# Patient Record
Sex: FEMALE | Race: WHITE | Employment: FULL TIME | URBAN - NONMETROPOLITAN AREA
[De-identification: names, ages, dates, MRNs, and addresses within clinical notes are randomized per-mention and may not be internally consistent; named-entity substitution may affect disease eponyms.]

---

## 2018-07-18 ENCOUNTER — HOSPITAL ENCOUNTER (OUTPATIENT)
Age: 28
Discharge: HOME | End: 2018-07-20
Payer: COMMERCIAL

## 2018-07-18 ENCOUNTER — OFFICE VISIT (OUTPATIENT)
Dept: PRIMARY CARE CLINIC | Age: 28
End: 2018-07-18
Payer: COMMERCIAL

## 2018-07-18 ENCOUNTER — HOSPITAL ENCOUNTER (OUTPATIENT)
Dept: GENERAL RADIOLOGY | Age: 28
Discharge: HOME | End: 2018-07-20
Payer: COMMERCIAL

## 2018-07-18 VITALS
DIASTOLIC BLOOD PRESSURE: 88 MMHG | WEIGHT: 132 LBS | TEMPERATURE: 98.7 F | SYSTOLIC BLOOD PRESSURE: 126 MMHG | HEART RATE: 93 BPM | OXYGEN SATURATION: 98 % | BODY MASS INDEX: 22.53 KG/M2 | HEIGHT: 64 IN

## 2018-07-18 DIAGNOSIS — S60.051A CONTUSION OF RIGHT LITTLE FINGER WITHOUT DAMAGE TO NAIL, INITIAL ENCOUNTER: ICD-10-CM

## 2018-07-18 DIAGNOSIS — S62.656A CLOSED NONDISPLACED FRACTURE OF MIDDLE PHALANX OF RIGHT LITTLE FINGER, INITIAL ENCOUNTER: Primary | ICD-10-CM

## 2018-07-18 PROBLEM — F11.21 OPIOID DEPENDENCE IN REMISSION (HCC): Status: ACTIVE | Noted: 2018-01-16

## 2018-07-18 PROCEDURE — 73140 X-RAY EXAM OF FINGER(S): CPT

## 2018-07-18 PROCEDURE — 99202 OFFICE O/P NEW SF 15 MIN: CPT | Performed by: NURSE PRACTITIONER

## 2018-07-18 NOTE — LETTER
[facility] [address] [id number]  Phone: [phone]  Fax: [phone]    NANCY Larson CNP        July 18, 2018     Patient: Alysa Velasquez   YOB: 1990   Date of Visit: 7/18/2018       To Whom it May Concern:    Alysa Velasquez was seen in my clinic on 7/18/2018. She may return to work on 07/19/2018 with no use of right hand until cleared by occupational health. Wear finger splint for comfort and support. If you have any questions or concerns, please don't hesitate to call.     Sincerely,         NANCY Larson CNP

## 2018-07-18 NOTE — PROGRESS NOTES
Constitutional:   Appears to be of stated age with warm, dry skin; normal coloration without rash of the exposed skin. Patient is well-appearing, well-hydrated, nontoxic, comfortable, alert and oriented, pleasant and talkative without apparent distress. They are oriented for age with age appropriate behavior. Cardiovascular: Normal rate and regular rhythm. Pulmonary/Chest: Effort normal and breath sounds normal.   Musculoskeletal:        Right elbow: Normal.       Right wrist: Normal.        Hands:  Remainder of right hand exam within normal limits without deformity and or pain of the metacarpals. Right hand digits thumb through ring finger without obvious deformity and/or decreased range of motion on exam.  Skin intact of right hand and all fingers. Neurological: She has normal strength and normal reflexes. Nursing note and vitals reviewed. /88   Pulse 93   Temp 98.7 °F (37.1 °C) (Oral)   Ht 5' 4\" (1.626 m)   Wt 132 lb (59.9 kg)   LMP 07/02/2018 (Exact Date)   SpO2 98%   Breastfeeding? No   BMI 22.66 kg/m²     Assessment:      Diagnosis Orders   1. Closed nondisplaced fracture of middle phalanx of right little finger, initial encounter     2. Contusion of right little finger without damage to nail, initial encounter  XR FINGER RIGHT (MIN 2 VIEWS)    Elastic Bandages & Supports (BASEBALL SPLINT) MISC    COBAN 3\"    ibuprofen (ADVIL;MOTRIN) 200 MG tablet       Plan:   Perez Wooten was seen today for finger injury. Diagnoses and all orders for this visit:    Closed nondisplaced fracture of middle phalanx of right little finger, initial encounter    Contusion of right little finger without damage to nail, initial encounter  -     XR FINGER RIGHT (MIN 2 VIEWS);  Future  -     Elastic Bandages & Supports (BASEBALL SPLINT) MISC; 1 Units by Does not apply route daily To right pinky finger for comfort and protection until instructed by [address] 3\"  -     ibuprofen

## 2018-07-18 NOTE — PATIENT INSTRUCTIONS
SURVEY:    You may be receiving a survey from [employer] regarding your visit today. Please complete the survey to enable us to provide the highest quality of care to you and your family. If you cannot score us a very good on any question, please call the office to discuss how we could of made your experience a very good one. Thank you. Patient Education        Hand Bruises: Care Instructions  Your Care Instructions  Bruises, or contusions, can happen as a result of an impact or fall. Most people think of a bruise as a black-and-blue spot. This happens when small blood vessels get torn and leak blood under the skin. The bruise may turn purplish black, reddish blue, or yellowish green as it heals. But bones and muscles can also get bruised. This may damage the hand but not cause a bruise that you can see. Most bruises aren't serious and will go away on their own in 2 to 4 weeks. But sometimes a more serious hand injury might not heal on its own. Tell your doctor if you have new symptoms or your injury is not getting better over time. You may have tests to see if you have bone or nerve damage. These tests may include X-rays, a CT scan, or an MRI. If you damaged bones or muscles, you may need more treatment. The doctor has checked you carefully, but problems can develop later. If you notice any problems or new symptoms, get medical treatment right away. Follow-up care is a key part of your treatment and safety. Be sure to make and go to all appointments, and call your doctor if you are having problems. It's also a good idea to know your test results and keep a list of the medicines you take. How can you care for yourself at home? · Put ice or a cold pack on the hand for 10 to 20 minutes at a time. Put a thin cloth between the ice and your skin. · Prop up your hand on a pillow when you ice it or anytime you sit or lie down during the next 3 days. Try to keep your hand above the level of your heart. This will help reduce swelling. · Be safe with medicines. Read and follow all instructions on the label. ¨ If the doctor gave you a prescription medicine for pain, take it as prescribed. ¨ If you are not taking a prescription pain medicine, ask your doctor if you can take an over-the-counter medicine. · Be sure to follow your doctor's advice about moving and exercising your injured hand. When should you call for help? Call your doctor now or seek immediate medical care if:    · Your pain gets worse.     · You have new or worse swelling.     · You have tingling, weakness, or numbness in the area near the bruise.     · The area near the bruise is cold or pale.     · You have symptoms of infection, such as:  ¨ Increased pain, swelling, warmth, or redness. ¨ Red streaks leading from the area. ¨ Pus draining from the area. ¨ A fever.    Watch closely for changes in your health, and be sure to contact your doctor if:    · You do not get better as expected. Where can you learn more? Go to [email or web address] and sign in to your [employer] account. Enter M422 in the [facility] box to learn more about \"Hand Bruises: Care Instructions. \"     If you do not have an account, please click on the \"Sign Up Now\" link. Current as of: November 20, 2017  Content Version: 11.6  © 8088-3825 [employer]. Care instructions adapted under license by [facility] ([facility]). If you have questions about a medical condition or this instruction, always ask your healthcare professional. [place] [address] any warranty or liability for your use of this information.

## 2018-07-19 RX ORDER — IBUPROFEN 200 MG
400 TABLET ORAL EVERY 6 HOURS PRN
COMMUNITY
Start: 2018-07-19

## 2018-07-24 ENCOUNTER — TELEPHONE (OUTPATIENT)
Dept: PRIMARY CARE CLINIC | Age: 28
End: 2018-07-24

## 2018-07-24 NOTE — TELEPHONE ENCOUNTER
Patient called in stating she was seen at the [facility] on 07/18/18 for a finger injury. Pt stated she injured her pinky finger at work. Pt was asked at the window if she wanted to go through workers comp and she stated \"no. \" Pt called in today stating she has to have surgery on her finger, and now wants to go through workers comp. Pt advised to call Melania at [facility] and let her know that she would like to do that and go from there. Pt voiced understanding.

## 2018-07-26 ENCOUNTER — HOSPITAL ENCOUNTER (OUTPATIENT)
Dept: GENERAL RADIOLOGY | Age: 28
Discharge: HOME | End: 2018-07-28
Payer: COMMERCIAL

## 2018-07-26 ENCOUNTER — HOSPITAL ENCOUNTER (OUTPATIENT)
Age: 28
Discharge: HOME | End: 2018-07-28
Payer: COMMERCIAL

## 2018-07-26 ENCOUNTER — HOSPITAL ENCOUNTER (OUTPATIENT)
Age: 28
Discharge: HOME | End: 2018-07-26
Payer: COMMERCIAL

## 2018-07-26 ENCOUNTER — HOSPITAL ENCOUNTER (OUTPATIENT)
Dept: PREADMISSION TESTING | Age: 28
Discharge: HOME | End: 2018-07-26
Payer: COMMERCIAL

## 2018-07-26 DIAGNOSIS — Z01.811 PRE-OP CHEST EXAM: ICD-10-CM

## 2018-07-26 LAB
ABSOLUTE EOS #: 0.1 K/UL (ref 0–0.4)
ABSOLUTE IMMATURE GRANULOCYTE: NORMAL K/UL (ref 0–0.3)
ABSOLUTE LYMPH #: 1.8 K/UL (ref 1–4.8)
ABSOLUTE MONO #: 0.5 K/UL (ref 0–1)
ANION GAP SERPL CALCULATED.3IONS-SCNC: 9 MMOL/L (ref 9–17)
BASOPHILS # BLD: 1 % (ref 0–2)
BASOPHILS ABSOLUTE: 0 K/UL (ref 0–0.2)
BUN BLDV-MCNC: 12 MG/DL (ref 6–20)
BUN/CREAT BLD: 19 (ref 9–20)
CALCIUM SERPL-MCNC: 9.5 MG/DL (ref 8.6–10.4)
CHLORIDE BLD-SCNC: 103 MMOL/L (ref 98–107)
CO2: 24 MMOL/L (ref 20–31)
CREAT SERPL-MCNC: 0.63 MG/DL (ref 0.5–0.9)
DIFFERENTIAL TYPE: YES
EOSINOPHILS RELATIVE PERCENT: 2 % (ref 0–5)
GFR AFRICAN AMERICAN: >60 ML/MIN
GFR NON-AFRICAN AMERICAN: >60 ML/MIN
GFR SERPL CREATININE-BSD FRML MDRD: NORMAL ML/MIN/{1.73_M2}
GFR SERPL CREATININE-BSD FRML MDRD: NORMAL ML/MIN/{1.73_M2}
GLUCOSE BLD-MCNC: 91 MG/DL (ref 70–99)
HCG QUALITATIVE: NEGATIVE
HCT VFR BLD CALC: 44.2 % (ref 36–46)
HEMOGLOBIN: 14.8 G/DL (ref 12–16)
IMMATURE GRANULOCYTES: NORMAL %
LYMPHOCYTES # BLD: 27 % (ref 15–40)
MCH RBC QN AUTO: 30 PG (ref 26–34)
MCHC RBC AUTO-ENTMCNC: 33.5 G/DL (ref 31–37)
MCV RBC AUTO: 89.3 FL (ref 80–100)
MONOCYTES # BLD: 7 % (ref 4–8)
NRBC AUTOMATED: NORMAL PER 100 WBC
PDW BLD-RTO: 14.7 % (ref 12.1–15.2)
PLATELET # BLD: 182 K/UL (ref 140–450)
PLATELET ESTIMATE: NORMAL
PMV BLD AUTO: NORMAL FL (ref 6–12)
POTASSIUM SERPL-SCNC: 4.6 MMOL/L (ref 3.7–5.3)
RBC # BLD: 4.95 M/UL (ref 4–5.2)
RBC # BLD: NORMAL 10*6/UL
SEG NEUTROPHILS: 63 % (ref 47–75)
SEGMENTED NEUTROPHILS ABSOLUTE COUNT: 4.1 K/UL (ref 2.5–7)
SODIUM BLD-SCNC: 136 MMOL/L (ref 135–144)
WBC # BLD: 6.6 K/UL (ref 3.5–11)
WBC # BLD: NORMAL 10*3/UL

## 2018-07-26 PROCEDURE — 36415 COLL VENOUS BLD VENIPUNCTURE: CPT

## 2018-07-26 PROCEDURE — 71046 X-RAY EXAM CHEST 2 VIEWS: CPT

## 2018-07-26 PROCEDURE — 80048 BASIC METABOLIC PNL TOTAL CA: CPT

## 2018-07-26 PROCEDURE — 85025 COMPLETE CBC W/AUTO DIFF WBC: CPT

## 2018-07-26 PROCEDURE — 84703 CHORIONIC GONADOTROPIN ASSAY: CPT

## 2018-07-27 ENCOUNTER — ANESTHESIA EVENT (OUTPATIENT)
Dept: OPERATING ROOM | Age: 28
End: 2018-07-27
Payer: COMMERCIAL

## 2018-08-01 ENCOUNTER — APPOINTMENT (OUTPATIENT)
Dept: GENERAL RADIOLOGY | Age: 28
End: 2018-08-01
Attending: ORTHOPAEDIC SURGERY
Payer: COMMERCIAL

## 2018-08-01 ENCOUNTER — HOSPITAL ENCOUNTER (OUTPATIENT)
Age: 28
Setting detail: OUTPATIENT SURGERY
Discharge: HOME | End: 2018-08-01
Attending: ORTHOPAEDIC SURGERY | Admitting: ORTHOPAEDIC SURGERY
Payer: COMMERCIAL

## 2018-08-01 ENCOUNTER — ANESTHESIA (OUTPATIENT)
Dept: OPERATING ROOM | Age: 28
End: 2018-08-01
Payer: COMMERCIAL

## 2018-08-01 VITALS
WEIGHT: 132 LBS | RESPIRATION RATE: 16 BRPM | TEMPERATURE: 97.5 F | OXYGEN SATURATION: 100 % | SYSTOLIC BLOOD PRESSURE: 124 MMHG | HEIGHT: 62 IN | HEART RATE: 98 BPM | BODY MASS INDEX: 24.29 KG/M2 | DIASTOLIC BLOOD PRESSURE: 88 MMHG

## 2018-08-01 VITALS
DIASTOLIC BLOOD PRESSURE: 59 MMHG | TEMPERATURE: 98.6 F | OXYGEN SATURATION: 100 % | RESPIRATION RATE: 17 BRPM | SYSTOLIC BLOOD PRESSURE: 97 MMHG

## 2018-08-01 DIAGNOSIS — S62.626D CLOSED DISPLACED FRACTURE OF MIDDLE PHALANX OF RIGHT LITTLE FINGER WITH ROUTINE HEALING, SUBSEQUENT ENCOUNTER: Primary | ICD-10-CM

## 2018-08-01 LAB — HCG(URINE) PREGNANCY TEST: NEGATIVE

## 2018-08-01 PROCEDURE — C1713 ANCHOR/SCREW BN/BN,TIS/BN: HCPCS | Performed by: ORTHOPAEDIC SURGERY

## 2018-08-01 PROCEDURE — 3700000000 HC ANESTHESIA ATTENDED CARE: Performed by: ORTHOPAEDIC SURGERY

## 2018-08-01 PROCEDURE — 2500000003 HC RX 250 WO HCPCS: Performed by: NURSE ANESTHETIST, CERTIFIED REGISTERED

## 2018-08-01 PROCEDURE — 7100000011 HC PHASE II RECOVERY - ADDTL 15 MIN: Performed by: ORTHOPAEDIC SURGERY

## 2018-08-01 PROCEDURE — 3600000013 HC SURGERY LEVEL 3 ADDTL 15MIN: Performed by: ORTHOPAEDIC SURGERY

## 2018-08-01 PROCEDURE — 3600000003 HC SURGERY LEVEL 3 BASE: Performed by: ORTHOPAEDIC SURGERY

## 2018-08-01 PROCEDURE — 2720000010 HC SURG SUPPLY STERILE: Performed by: ORTHOPAEDIC SURGERY

## 2018-08-01 PROCEDURE — 2709999900 HC NON-CHARGEABLE SUPPLY: Performed by: ORTHOPAEDIC SURGERY

## 2018-08-01 PROCEDURE — 6360000002 HC RX W HCPCS: Performed by: ORTHOPAEDIC SURGERY

## 2018-08-01 PROCEDURE — 7100000010 HC PHASE II RECOVERY - FIRST 15 MIN: Performed by: ORTHOPAEDIC SURGERY

## 2018-08-01 PROCEDURE — 6360000002 HC RX W HCPCS: Performed by: NURSE ANESTHETIST, CERTIFIED REGISTERED

## 2018-08-01 PROCEDURE — 84703 CHORIONIC GONADOTROPIN ASSAY: CPT

## 2018-08-01 PROCEDURE — 2500000003 HC RX 250 WO HCPCS: Performed by: ORTHOPAEDIC SURGERY

## 2018-08-01 PROCEDURE — 76000 FLUOROSCOPY <1 HR PHYS/QHP: CPT

## 2018-08-01 PROCEDURE — 2580000003 HC RX 258: Performed by: ORTHOPAEDIC SURGERY

## 2018-08-01 PROCEDURE — 3700000001 HC ADD 15 MINUTES (ANESTHESIA): Performed by: ORTHOPAEDIC SURGERY

## 2018-08-01 DEVICE — K WIRE FIX L4IN DIA0.045IN DBL END TRCR SMOOTH: Type: IMPLANTABLE DEVICE | Status: FUNCTIONAL

## 2018-08-01 RX ORDER — ONDANSETRON 2 MG/ML
4 INJECTION INTRAMUSCULAR; INTRAVENOUS EVERY 6 HOURS PRN
Status: DISCONTINUED | OUTPATIENT
Start: 2018-08-01 | End: 2018-08-01 | Stop reason: HOSPADM

## 2018-08-01 RX ORDER — SODIUM CHLORIDE 0.9 % (FLUSH) 0.9 %
10 SYRINGE (ML) INJECTION PRN
Status: DISCONTINUED | OUTPATIENT
Start: 2018-08-01 | End: 2018-08-01 | Stop reason: HOSPADM

## 2018-08-01 RX ORDER — SODIUM CHLORIDE, SODIUM LACTATE, POTASSIUM CHLORIDE, CALCIUM CHLORIDE 600; 310; 30; 20 MG/100ML; MG/100ML; MG/100ML; MG/100ML
INJECTION, SOLUTION INTRAVENOUS CONTINUOUS
Status: DISCONTINUED | OUTPATIENT
Start: 2018-08-01 | End: 2018-08-01 | Stop reason: HOSPADM

## 2018-08-01 RX ORDER — PROPOFOL 10 MG/ML
INJECTION, EMULSION INTRAVENOUS PRN
Status: DISCONTINUED | OUTPATIENT
Start: 2018-08-01 | End: 2018-08-01 | Stop reason: SDUPTHER

## 2018-08-01 RX ORDER — HYDROCODONE BITARTRATE AND ACETAMINOPHEN 5; 325 MG/1; MG/1
1 TABLET ORAL EVERY 4 HOURS PRN
Status: DISCONTINUED | OUTPATIENT
Start: 2018-08-01 | End: 2018-08-01

## 2018-08-01 RX ORDER — ACETAMINOPHEN 325 MG/1
650 TABLET ORAL EVERY 4 HOURS PRN
Status: DISCONTINUED | OUTPATIENT
Start: 2018-08-01 | End: 2018-08-01 | Stop reason: HOSPADM

## 2018-08-01 RX ORDER — ONDANSETRON 2 MG/ML
INJECTION INTRAMUSCULAR; INTRAVENOUS PRN
Status: DISCONTINUED | OUTPATIENT
Start: 2018-08-01 | End: 2018-08-01 | Stop reason: SDUPTHER

## 2018-08-01 RX ORDER — LIDOCAINE HYDROCHLORIDE 10 MG/ML
INJECTION, SOLUTION EPIDURAL; INFILTRATION; INTRACAUDAL; PERINEURAL PRN
Status: DISCONTINUED | OUTPATIENT
Start: 2018-08-01 | End: 2018-08-01 | Stop reason: SDUPTHER

## 2018-08-01 RX ORDER — BUPIVACAINE HYDROCHLORIDE 2.5 MG/ML
INJECTION, SOLUTION EPIDURAL; INFILTRATION; INTRACAUDAL PRN
Status: DISCONTINUED | OUTPATIENT
Start: 2018-08-01 | End: 2018-08-01 | Stop reason: HOSPADM

## 2018-08-01 RX ORDER — SODIUM CHLORIDE 0.9 % (FLUSH) 0.9 %
10 SYRINGE (ML) INJECTION EVERY 12 HOURS SCHEDULED
Status: DISCONTINUED | OUTPATIENT
Start: 2018-08-01 | End: 2018-08-01 | Stop reason: HOSPADM

## 2018-08-01 RX ORDER — HYDROCODONE BITARTRATE AND ACETAMINOPHEN 5; 325 MG/1; MG/1
2 TABLET ORAL EVERY 4 HOURS PRN
Status: DISCONTINUED | OUTPATIENT
Start: 2018-08-01 | End: 2018-08-01

## 2018-08-01 RX ORDER — HYDROCODONE BITARTRATE AND ACETAMINOPHEN 5; 325 MG/1; MG/1
1-2 TABLET ORAL EVERY 6 HOURS PRN
Qty: 40 TABLET | Refills: 0 | Status: SHIPPED | OUTPATIENT
Start: 2018-08-01 | End: 2018-08-08

## 2018-08-01 RX ORDER — TRAMADOL HYDROCHLORIDE 50 MG/1
50 TABLET ORAL EVERY 6 HOURS PRN
Qty: 40 TABLET | Refills: 0 | Status: SHIPPED | OUTPATIENT
Start: 2018-08-01 | End: 2018-08-08

## 2018-08-01 RX ORDER — DEXAMETHASONE SODIUM PHOSPHATE 10 MG/ML
INJECTION INTRAMUSCULAR; INTRAVENOUS PRN
Status: DISCONTINUED | OUTPATIENT
Start: 2018-08-01 | End: 2018-08-01 | Stop reason: SDUPTHER

## 2018-08-01 RX ORDER — KETOROLAC TROMETHAMINE 30 MG/ML
INJECTION, SOLUTION INTRAMUSCULAR; INTRAVENOUS PRN
Status: DISCONTINUED | OUTPATIENT
Start: 2018-08-01 | End: 2018-08-01 | Stop reason: SDUPTHER

## 2018-08-01 RX ORDER — FENTANYL CITRATE 50 UG/ML
INJECTION, SOLUTION INTRAMUSCULAR; INTRAVENOUS PRN
Status: DISCONTINUED | OUTPATIENT
Start: 2018-08-01 | End: 2018-08-01 | Stop reason: SDUPTHER

## 2018-08-01 RX ORDER — MIDAZOLAM HYDROCHLORIDE 1 MG/ML
INJECTION INTRAMUSCULAR; INTRAVENOUS PRN
Status: DISCONTINUED | OUTPATIENT
Start: 2018-08-01 | End: 2018-08-01 | Stop reason: SDUPTHER

## 2018-08-01 RX ADMIN — SODIUM CHLORIDE, POTASSIUM CHLORIDE, SODIUM LACTATE AND CALCIUM CHLORIDE: 600; 310; 30; 20 INJECTION, SOLUTION INTRAVENOUS at 12:15

## 2018-08-01 RX ADMIN — FENTANYL CITRATE 100 MCG: 50 INJECTION, SOLUTION INTRAMUSCULAR; INTRAVENOUS at 13:49

## 2018-08-01 RX ADMIN — LIDOCAINE HYDROCHLORIDE 5 ML: 10 INJECTION, SOLUTION EPIDURAL; INFILTRATION; INTRACAUDAL; PERINEURAL at 13:49

## 2018-08-01 RX ADMIN — KETOROLAC TROMETHAMINE 30 MG: 30 INJECTION, SOLUTION INTRAMUSCULAR at 13:49

## 2018-08-01 RX ADMIN — PROPOFOL 200 MG: 10 INJECTION, EMULSION INTRAVENOUS at 13:49

## 2018-08-01 RX ADMIN — CEFAZOLIN SODIUM 2 G: 2 SOLUTION INTRAVENOUS at 13:44

## 2018-08-01 RX ADMIN — MIDAZOLAM HYDROCHLORIDE 2 MG: 2 INJECTION, SOLUTION INTRAMUSCULAR; INTRAVENOUS at 13:44

## 2018-08-01 RX ADMIN — ONDANSETRON 4 MG: 2 INJECTION, SOLUTION INTRAMUSCULAR; INTRAVENOUS at 13:49

## 2018-08-01 RX ADMIN — DEXAMETHASONE SODIUM PHOSPHATE 10 MG: 10 INJECTION INTRAMUSCULAR; INTRAVENOUS at 13:49

## 2018-08-01 ASSESSMENT — PAIN DESCRIPTION - DESCRIPTORS
DESCRIPTORS: SORE
DESCRIPTORS: ACHING
DESCRIPTORS: SORE

## 2018-08-01 ASSESSMENT — PAIN DESCRIPTION - LOCATION
LOCATION: HAND
LOCATION: HAND

## 2018-08-01 ASSESSMENT — PAIN SCALES - GENERAL
PAINLEVEL_OUTOF10: 2
PAINLEVEL_OUTOF10: 2

## 2018-08-01 ASSESSMENT — PAIN DESCRIPTION - ORIENTATION
ORIENTATION: RIGHT
ORIENTATION: RIGHT

## 2018-08-01 ASSESSMENT — PAIN DESCRIPTION - PAIN TYPE
TYPE: SURGICAL PAIN
TYPE: SURGICAL PAIN

## 2018-08-01 ASSESSMENT — PAIN - FUNCTIONAL ASSESSMENT: PAIN_FUNCTIONAL_ASSESSMENT: 0-10

## 2018-08-01 NOTE — PROGRESS NOTES
Discharge instructions reviewed with pt. States \"I don't think Tylenol and Advil is going to be enough for pain. \" Also states \"I will just come back to ER if I have pain. \" Encouraged pt to alternate Tylenol and Advil as oardered, elevate her hand and use ice intermittently for next 2-3 days. Also encouraged her to call Dr. Sophy Moran' office if has continued pain.

## 2018-08-01 NOTE — ANESTHESIA POSTPROCEDURE EVALUATION
Department of Anesthesiology  Postprocedure Note    Patient: Lam Bowie  MRN: [id number]  YOB: 1990  Date of evaluation: 8/1/2018  Time:  2:46 PM     Procedure Summary     Date:  08/01/18 Room / Location:  [address] [place] [address]    Anesthesia Start:  2760 Anesthesia Stop:  8351    Procedure:  RIGHT LITTLE FINGER OPEN REDUCTION INTERNAL FIXATION (Right ) Diagnosis:  (RIGHT LITTLE FINGER P2 FRACTURE)    Surgeon:  Renetta Nicole DO Responsible Provider:  NANCY Howard CRNA    Anesthesia Type:  regional ASA Status:  2          Anesthesia Type: regional    Tiffani Phase I: Tiffani Score: 10    Tiffani Phase II:      Last vitals: Reviewed and per EMR flowsheets.        Anesthesia Post Evaluation    Patient location during evaluation: bedside  Patient participation: complete - patient participated  Level of consciousness: awake and alert  Pain score: 0  Airway patency: patent  Nausea & Vomiting: no nausea and no vomiting  Complications: no  Cardiovascular status: hemodynamically stable  Respiratory status: acceptable and spontaneous ventilation  Hydration status: euvolemic

## 2018-08-01 NOTE — PROGRESS NOTES
Up to bathroom with steady gait. Voids. Drinking water without c/o nausea. Discharge Criteria  Outpatients must meet criteria 1 through 7. Up to restroom, void sufficient amount. Yes    1. Minimum 30 minutes after last dose of sedative medication, minimum 120 minutes after last dose of reversal agent. Yes    2. Systolic BP stable within 20 mmHg for 30 minutes & systolic BP between 90 & 398 or within 10 mmHg of baseline. Yes    3. Pulse between 60 and 100 or within 10 bpm of baseline. Yes    4. Spontaneous respiratory rate >/= 10 per minute. Yes    5. SaO2 >/= 95 or  >/= baseline. Yes    6. Able to cough and swallow or return to baseline function. Yes    7. Alert and oriented or return to baseline mental status. Yes    8. Demonstrates controlled, coordinated movements, ambulates with steady gait, or return to baseline activity function. Yes    9. Minimal or no pain or nausea, or at a level tolerable and acceptable to patient. Yes    10. Takes and retains oral fluids as allowed. Yes    11. Procedural / perioperative site stable. Minimal or no bleeding. Yes        12. If GI endoscopy procedure, minimal or no abdominal distention or passing flatus. Yes    13. Written discharge instructions and emergency telephone number provided. Yes    14. Accompanied by a responsible adult. Yes    Adult patient discharged from facility without responsible person meets above criteria plus the following:   a) remains awake without stimulus for 30 minutes     b) oriented appropriate for age      c) all vital signs stable   d) no significant risk of losing protective reflexes      e) able to maintain pre-procedure mobility without assistance   f) no nausea or dizziness      g) transportation arrangements that do not require patient to operate motor   Vehicle.   Yes

## 2018-08-01 NOTE — ANESTHESIA PROCEDURE NOTES
Right adductor canal block and pop sciatic block     Patient location during procedure: patient floor  Start time: 8/1/2018 12:55 PM  End time: 8/1/2018 1:05 PM  Staffing  Resident/CRNA: Charo EUCEDA  Preanesthetic Checklist  Completed: patient identified, site marked, surgical consent, pre-op evaluation, timeout performed, IV checked, risks and benefits discussed, monitors and equipment checked, anesthesia consent given, oxygen available and patient being monitored  Peripheral Block  Patient position: supine  Prep: ChloraPrep  Patient monitoring: cardiac monitor, continuous pulse ox and frequent blood pressure checks  Block type: Sciatic and Saphenous  Laterality: right  Injection technique: single-shot  Procedures: ultrasound guided and nerve stimulator  Local infiltration: ropivacaine and decadron  Infiltration strength: 0.5 %  Provider prep: mask, sterile gloves and sterile gown  Local infiltration: ropivacaine and decadron  Needle  Needle type:  Other   Needle gauge: 22 G  Needle length: 10 cm  Needle localization: anatomical landmarks, nerve stimulator and ultrasound guidanceOther needle type: pajunk   Assessment  Injection assessment: negative aspiration for heme, no paresthesia on injection and local visualized surrounding nerve on ultrasound  Slow fractionated injection: yes  Hemodynamics: stable  Additional Notes  1mg versed, fentanyl 50mcg  15ml adductor canal   20 ml pop sciatic   Reason for block: post-op pain management and at surgeon's request

## 2018-08-01 NOTE — ANESTHESIA PRE PROCEDURE
Department of Anesthesiology  Preprocedure Note       Name:  James Prince   Age:  29 y.o.  :  1990                                          MRN:  [id number]         Date:  2018      Surgeon: Ynes Alvarez):  Ryan Ellis DO    Procedure: Procedure(s):  RIGHT LITTLE FINGER OPEN REDUCTION INTERNAL FIXATION    Medications prior to admission:   Prior to Admission medications    Medication Sig Start Date End Date Taking? Authorizing Provider   HYDROcodone-acetaminophen (NORCO) 5-325 MG per tablet Take 1-2 tablets by mouth every 6 hours as needed for Pain for up to 7 days. [place] Date: 18 Yes Ryan Ellis DO   ibuprofen (ADVIL;MOTRIN) 200 MG tablet Take 2 tablets by mouth every 6 hours as needed for Pain 18   NANCY Vail Junior - CNP       Current medications:    Current Facility-Administered Medications   Medication Dose Route Frequency Provider Last Rate Last Dose    lactated ringers infusion   Intravenous Continuous Ryan Ellis DO 50 mL/hr at 18 1215      sodium chloride flush 0.9 % injection 10 mL  10 mL Intravenous 2 times per day Ryan Ellis DO        sodium chloride flush 0.9 % injection 10 mL  10 mL Intravenous PRN Ryan Ellis DO        ceFAZolin (ANCEF) 2 g in dextrose 3 % 50 mL IVPB (duplex)  2 g Intravenous On Call to [facility] Tiny Pictures, DO           Allergies:  No Known Allergies    Problem List:    Patient Active Problem List   Diagnosis Code    [facility] F11.21    Disp fx of middle phalanx of right little finger with routine healing S62.626D       Past Medical History:        Diagnosis Date    Anxiety     Seizures ([facility] [address]     head injury--last seizure 2 years ago       Past Surgical History:  History reviewed. No pertinent surgical history.     Social History:    Social History   Substance Use Topics    Smoking status: Current Every Day Smoker     Packs/day: 0.50    Smokeless tobacco: Never Used    Alcohol use 1.8 oz/week     3 Cans of beer per week                                Ready to quit: Not Answered  Counseling given: Not Answered      Vital Signs (Current):   Vitals:    08/01/18 1213 08/01/18 1215   BP: 123/81    Pulse: (!) 1 101   Resp: 16    Temp: 37.2 °C (98.9 °F)    SpO2: 99%    Weight: 132 lb (59.9 kg)    Height: 5' 2\" (1.575 m)                                               BP Readings from Last 3 Encounters:   08/01/18 123/81   07/18/18 126/88   09/27/16 133/83       NPO Status: Time of last liquid consumption: 2345                        Time of last solid consumption: 2345                        Date of last liquid consumption: 07/31/18                        Date of last solid food consumption: 07/31/18    BMI:   Wt Readings from Last 3 Encounters:   08/01/18 132 lb (59.9 kg)   07/18/18 132 lb (59.9 kg)     Body mass index is 24.14 kg/m². CBC:   Lab Results   Component Value Date    WBC 6.6 07/26/2018    RBC 4.95 07/26/2018    HGB 14.8 07/26/2018    HCT 44.2 07/26/2018    MCV 89.3 07/26/2018    RDW 14.7 07/26/2018     07/26/2018       CMP:   Lab Results   Component Value Date     07/26/2018    K 4.6 07/26/2018     07/26/2018    CO2 24 07/26/2018    BUN 12 07/26/2018    CREATININE 0.63 07/26/2018    GFRAA >60 07/26/2018    LABGLOM >60 07/26/2018    GLUCOSE 91 07/26/2018    PROT 7.2 09/27/2016    CALCIUM 9.5 07/26/2018    BILITOT 0.24 09/27/2016    ALKPHOS 80 09/27/2016    AST 42 09/27/2016    ALT 21 09/27/2016       POC Tests: No results for input(s): POCGLU, POCNA, POCK, POCCL, POCBUN, POCHEMO, POCHCT in the last 72 hours.     Coags: No results found for: PROTIME, INR, APTT    Current HCG negative   ABGs: No results found for: PHART, PO2ART, NBA2ANP, EOY2SML, BEART, R2JYVWDT     Type & Screen (If Applicable):  No results found for: LABABO, [address]    Anesthesia Evaluation  Patient summary reviewed and Nursing notes reviewed no history of anesthetic complications:

## 2018-08-02 NOTE — OP NOTE
She will be discharged this  day. DAGMAR LINDQUIST    D: 08/01/2018 14:45:45       T: 08/01/2018 23:55:14     [email or web address]  Job#: [id number]     Doc#: [id number]    CC:  Conchis Mallory

## 2018-08-09 ENCOUNTER — HOSPITAL ENCOUNTER (OUTPATIENT)
Dept: GENERAL RADIOLOGY | Age: 28
Discharge: HOME | End: 2018-08-11
Payer: COMMERCIAL

## 2018-08-09 ENCOUNTER — HOSPITAL ENCOUNTER (OUTPATIENT)
Age: 28
Discharge: HOME | End: 2018-08-11
Payer: COMMERCIAL

## 2018-08-09 DIAGNOSIS — S62.656D CLOSED NONDISPLACED FRACTURE OF MIDDLE PHALANX OF RIGHT LITTLE FINGER WITH ROUTINE HEALING: ICD-10-CM

## 2018-08-09 DIAGNOSIS — S60.051D CONTUSION OF RIGHT LITTLE FINGER WITHOUT DAMAGE TO NAIL, SUBSEQUENT ENCOUNTER: ICD-10-CM

## 2018-08-09 PROCEDURE — 73130 X-RAY EXAM OF HAND: CPT

## 2018-08-30 ENCOUNTER — HOSPITAL ENCOUNTER (OUTPATIENT)
Dept: GENERAL RADIOLOGY | Age: 28
Discharge: HOME | End: 2018-09-01
Payer: COMMERCIAL

## 2018-08-30 ENCOUNTER — HOSPITAL ENCOUNTER (OUTPATIENT)
Age: 28
Discharge: HOME | End: 2018-09-01
Payer: COMMERCIAL

## 2018-08-30 DIAGNOSIS — S62.656D CLOSED NONDISPLACED FRACTURE OF MIDDLE PHALANX OF RIGHT LITTLE FINGER WITH ROUTINE HEALING: ICD-10-CM

## 2018-08-30 DIAGNOSIS — S60.051D CONTUSION OF RIGHT LITTLE FINGER WITHOUT DAMAGE TO NAIL, SUBSEQUENT ENCOUNTER: ICD-10-CM

## 2018-08-30 PROCEDURE — 73130 X-RAY EXAM OF HAND: CPT

## 2018-09-05 ENCOUNTER — HOSPITAL ENCOUNTER (OUTPATIENT)
Dept: OCCUPATIONAL THERAPY | Age: 28
Setting detail: THERAPIES SERIES
Discharge: HOME | End: 2018-09-05
Payer: COMMERCIAL

## 2018-09-05 PROCEDURE — 97166 OT EVAL MOD COMPLEX 45 MIN: CPT

## 2018-09-05 NOTE — PLAN OF CARE
Phone: [phone]         Fax: [phone]                       Outpatient Occupational Therapy                                                                         Plan of Care    Date: 2018  Patient: Ko Duffy  : 1990  ACCT #: [de-identified]    [facility]#: [id number]  Referring Practitioner: Dr. Dionisio Santoyo    Diagnosis: R LF fracture   Additional Pertinent Hx: Patient reports she was closing a box at work, finger got stuck in box while being closed on 18. An ORIF was completed on 18, placing two pins . Patient was placed in a soft cast post surgery. Pins were removed on 18. Patient is currently wearing a removable brace. Patient is to wear the brace at work only, able to remove when at home. Treatment Diagnosis: R LF fracture    Onset Date: 18    Per Physician Order              Assessment  Performance deficits / Impairments: Decreased ROM, Decreased strength, Decreased high-level IADLs, Decreased fine motor control   Prognosis: Good       PLAN     Frequency:  2-3 times/wk  Duration:  6 weeks  [x] HP/CP      [] Electrical Stimulation   [x]  Strengthening    [x]  Active/Passive ROM  [] Muscle Re-education    [x] Fine Motor Coordination    []  Ultrasound   [x]  Splinting  [] Developmental Therapy    [] Sensory Integration [x]  Patient Education/HEP [] Visual Perception Retraining   [x] ADL Training   [] Cognitive Retraining  [x] Fluidotherapy  [x] Paraffin   [x] Therapeutic Exercise  [x] Therapeutic Activity  [] Other:    GOALS  Short term goals  Time Frame for Short term goals: 2 weeks (18)  Short term goal 1: Patient to be independent with HEP. Short term goal 2: Edema reduction of R small finger PIP joint to 5.2 cm or less in order to allow for improved AROM. Short term goal 3: Patient to demonstrate ability to touch thumb to each finger in order to  small objects.    Long term goals  Time Frame for Long term goals : 6 weeks (10-17-18)   Long term goal 1: Patient to demonstrate ability to produce a full fist, touching all finger tips to [facility] in order to grasp objects in the R hand. Long term goal 2: Patient to increase AROM of the small finger MCP 0-80, PIP 0-90, DIP 0-70 in order to  change and keep in hand. Long term goal 3: Patient to increase R  strength to within 10% of L hand in order to complete work duties without difficulty. Long term goal 4: Patient to increase R lateral pinch strength to within 10% of L hand in order to complete work duties without difficulty. PITER Bhatia/KINSEY                    Date: 9/5/2018      _____________________________________ Date: 9/5/2018  Physician Signature    By signing above or cosigning electronically, I have reviewed this Plan of Care and certify a need for medically necessary rehabilitation services.

## 2018-09-05 NOTE — PROGRESS NOTES
Phone: [address]          Fax: [phone]                       Outpatient Occupational Therapy                                                                            Evaluation    Date: 2018  Patient: Sandra Ugalde  : 1990  ACCT#: [de-identified]   Referring Practitioner: Dr. Luna Gaxiola    Diagnosis: R LF fracture    Additional Pertinent Hx: Patient reports she was closing a box at work, finger got stuck in box while being closed on 18. An ORIF was completed on 18, placing two pins . Patient was placed in a soft cast post surgery. Pins were removed on 18. Patient is currently wearing a removable brace. Patient is to wear the brace at work only, able to remove when at home. Treatment Diagnosis: R LF fracture  Onset Date: 18    Per Physician Order              Subjective  Subjective: Patient is 20 minutes late to evaluation. Hand Dominance: Right  Social/Functional History  Occupation: Full time employment  Type of occupation: [employer], currently working. IADL History  Occupation: Full time employment  Type of occupation: [employer], currently working.          Objective                 LUE Edema - Circumference (cm)  L Little PIP: 5.0 cm   RUE Edema - Circumference (cm)  R Little PIP: 5.7 cm   LUE AROM (degrees)  L Wrist Flexion 0-80: 0-75  L Wrist Extension 0-70: 0-70  Left Hand AROM (degrees)  L Thumb Opposition: Can touch thumb to each finger   L Ring  MCP 0-90: 0-82  L Ring PIP 0-100: 0-100  L Ring DIP 0-70: 0-70  L Little  MCP 0-90: 0-84  L Little PIP 0-100: 0-92  L Little DIP 0-70: 0-72  RUE AROM (degrees)  R Wrist Flexion 0-80: 0-75  R Wrist Extension 0-70: 0-70  Right Hand AROM (degrees)  Right Hand General AROM: D2-D3-2.0 cm from [facility], D4-0, D5-3.0 cm from [facility]   R Thumb Opposition: Unable to touch small finger   R Ring  MCP 0-90: 0-82  R Ring PIP 0-100: 0-100  R Ring DIP 0-70: 0-32  R Little  MCP 0-90: 0-70  R Little

## 2018-09-07 ENCOUNTER — HOSPITAL ENCOUNTER (OUTPATIENT)
Dept: OCCUPATIONAL THERAPY | Age: 28
Setting detail: THERAPIES SERIES
Discharge: HOME | End: 2018-09-07
Payer: COMMERCIAL

## 2018-09-07 PROCEDURE — 97110 THERAPEUTIC EXERCISES: CPT

## 2018-09-07 PROCEDURE — 97140 MANUAL THERAPY 1/> REGIONS: CPT

## 2018-09-07 PROCEDURE — 97530 THERAPEUTIC ACTIVITIES: CPT

## 2018-09-12 ENCOUNTER — HOSPITAL ENCOUNTER (OUTPATIENT)
Dept: OCCUPATIONAL THERAPY | Age: 28
Setting detail: THERAPIES SERIES
Discharge: HOME | End: 2018-09-12
Payer: COMMERCIAL

## 2018-09-12 PROCEDURE — 97018 PARAFFIN BATH THERAPY: CPT

## 2018-09-12 PROCEDURE — 97530 THERAPEUTIC ACTIVITIES: CPT

## 2018-09-12 PROCEDURE — 97110 THERAPEUTIC EXERCISES: CPT

## 2018-09-12 NOTE — PROGRESS NOTES
independent with HEP. - MET  Short term goal 2: Edema reduction of R small finger PIP joint to 5.2 cm or less in order to allow for improved AROM. Short term goal 3: Patient to demonstrate ability to touch thumb to each finger in order to  small objects. - MET  Long term goals  Time Frame for Long term goals : 6 weeks (10-17-18)   Long term goal 1: Patient to demonstrate ability to produce a full fist, touching all finger tips to [facility] in order to grasp objects in the R hand. - MET  Long term goal 2: Patient to increase AROM of the small finger MCP 0-80, PIP 0-90, DIP 0-70 in order to  change and keep in hand. Long term goal 3: Patient to increase R  strength to within 10% of L hand in order to complete work duties without difficulty. Long term goal 4: Patient to increase R lateral pinch strength to within 10% of L hand in order to complete work duties without difficulty.           Astrid Mello  OTR/L  Date: 9/12/2018

## 2018-09-13 ENCOUNTER — HOSPITAL ENCOUNTER (OUTPATIENT)
Dept: GENERAL RADIOLOGY | Age: 28
Discharge: HOME | End: 2018-09-15
Payer: COMMERCIAL

## 2018-09-13 ENCOUNTER — HOSPITAL ENCOUNTER (OUTPATIENT)
Age: 28
Discharge: HOME | End: 2018-09-15
Payer: COMMERCIAL

## 2018-09-13 DIAGNOSIS — S62.656D CLOSED NONDISPLACED FRACTURE OF MIDDLE PHALANX OF RIGHT LITTLE FINGER WITH ROUTINE HEALING: ICD-10-CM

## 2018-09-13 DIAGNOSIS — S60.051D CONTUSION OF RIGHT LITTLE FINGER WITHOUT DAMAGE TO NAIL, SUBSEQUENT ENCOUNTER: ICD-10-CM

## 2018-09-13 PROCEDURE — 73130 X-RAY EXAM OF HAND: CPT

## 2018-09-14 ENCOUNTER — HOSPITAL ENCOUNTER (OUTPATIENT)
Dept: OCCUPATIONAL THERAPY | Age: 28
Setting detail: THERAPIES SERIES
Discharge: HOME | End: 2018-09-14
Payer: COMMERCIAL

## 2018-09-14 PROCEDURE — 97140 MANUAL THERAPY 1/> REGIONS: CPT

## 2018-09-14 PROCEDURE — 97018 PARAFFIN BATH THERAPY: CPT

## 2018-09-14 PROCEDURE — 97530 THERAPEUTIC ACTIVITIES: CPT

## 2018-09-14 NOTE — PROGRESS NOTES
Phone: [address]    Fax: [phone]                       Outpatient Occupational Therapy                                                                            Daily Note  Date: 2018   MRN: [id number]    ACCT#: [de-identified]  Patient: Gulshan Sosa  : 1990  Referring Practitioner: Dr. Ryan Zhang    Diagnosis: R LF fracture      Onset Date: 18  Total # of Visits Approved: 12 Per Physician Order  Total # of Visits to Date: 4  No Show: 1  Canceled Appointment: 0       Pre-Treament Pain: 5/10  Pain at present: 5  Subjective: Patient continues to report \"it's sore. \"      Exercises/Modalities:  See DocFlow Sheet    Assessment  Assessment: Patient reports doctor appointment yesterday went well. Doctor stated that he was pleased with xrays. Plans to schedule separate appointment for CT symptoms on L. Patient reports she returns to full duty on Monday. Issued pink and blue sponges this session for light strengthening. Continues to tolerate session well. Will progress as able. Prognosis: Good       Post Treatment Pain:   5/10    Goals   Short Term Goals  Time Frame for Short term goals: 2 weeks (18)  Short term goal 1: Patient to be independent with HEP. - MET  Short term goal 2: Edema reduction of R small finger PIP joint to 5.2 cm or less in order to allow for improved AROM. Short term goal 3: Patient to demonstrate ability to touch thumb to each finger in order to  small objects. - MET        Long Term Goals  Time Frame for Long term goals : 6 weeks (10-17-18)   Long term goal 1: Patient to demonstrate ability to produce a full fist, touching all finger tips to [facility] in order to grasp objects in the R hand. - MET  Long term goal 2: Patient to increase AROM of the small finger MCP 0-80, PIP 0-90, DIP 0-70 in order to  change and keep in hand.    Long term goal 3: Patient to increase R  strength to within 10% of L hand in order to complete work duties without difficulty. Long term goal 4: Patient to increase R lateral pinch strength to within 10% of L hand in order to complete work duties without difficulty.             Time In: 0915  Time Out: 1000  Timed Coded Minutes: 45  Total Treatment Time: Vimal 29, SNYDER/L    Date: 9/14/2018

## 2018-09-19 ENCOUNTER — HOSPITAL ENCOUNTER (OUTPATIENT)
Dept: OCCUPATIONAL THERAPY | Age: 28
Setting detail: THERAPIES SERIES
Discharge: HOME | End: 2018-09-19
Payer: COMMERCIAL

## 2018-09-19 PROCEDURE — 97110 THERAPEUTIC EXERCISES: CPT

## 2018-09-19 PROCEDURE — 97018 PARAFFIN BATH THERAPY: CPT

## 2018-09-19 PROCEDURE — 97140 MANUAL THERAPY 1/> REGIONS: CPT

## 2018-09-19 NOTE — PROGRESS NOTES
Phone: [address]    Fax: [phone]                       Outpatient Occupational Therapy                                                                            Daily Note  Date: 2018   MRN: [id number]    ACCT#: [de-identified]  Patient: James Prince  : 1990  Referring Practitioner: Dr. Frances Doll    Diagnosis: R LF fracture      Onset Date: 18  Total # of Visits Approved: 12 Per Physician Order  Total # of Visits to Date: 5  No Show: 2  Canceled Appointment: 0       Pre-Treament Pain: 2/10  Pain at present: 2  Subjective: Patient reports \"I took some medicine this morning. \"          Exercises/Modalities:  See DocFlow Sheet    Assessment  Assessment: Upon arrival patient reports she did not go to the ED to check for infection in the small finger. Therapist inspected finger before treatment. There was no evidence of infection. Therapist noted no redness, warmth, increased edema, or sensitive to the touch. Patient reports she utilized an ice pack the previous day which helped with edema. Patient also states she wants to get a second opinion on the finger as she feels \"the doctor messed it up. \"  Patient states she is also seeking a lump sum check from her employer due to injury. Patient is progressing well with OT. Issued yellow putty home program on this date. Will continue to progress as patient tolerates.                Post Treatment Pain: 2/10      Goals  Short Term Goals  Time Frame for Short term goals: 2 weeks (18)  Short term goal 1: Patient to be independent with HEP. - MET  Short term goal 2: Edema reduction of R small finger PIP joint to 5.2 cm or less in order to allow for improved AROM. - NOT MET   Short term goal 3: Patient to demonstrate ability to touch thumb to each finger in order to  small objects. - MET        Long Term Goals  Time Frame for Long term goals : 6 weeks (10-17-18)   Long term goal 1: Patient to demonstrate ability to produce a full fist, touching all finger tips to [facility] in order to grasp objects in the R hand. - MET  Long term goal 2: Patient to increase AROM of the small finger MCP 0-80, PIP 0-90, DIP 0-70 in order to  change and keep in hand. Long term goal 3: Patient to increase R  strength to within 10% of L hand in order to complete work duties without difficulty. Long term goal 4: Patient to increase R lateral pinch strength to within 10% of L hand in order to complete work duties without difficulty.               Time In: 1020  Time Out: 1100  Timed Coded Minutes: 30  Total Treatment Time: [address] [facility]  OTR/L  Date: 9/19/2018

## 2018-09-21 ENCOUNTER — HOSPITAL ENCOUNTER (OUTPATIENT)
Dept: OCCUPATIONAL THERAPY | Age: 28
Setting detail: THERAPIES SERIES
Discharge: HOME | End: 2018-09-21
Payer: COMMERCIAL

## 2018-09-21 PROCEDURE — 97018 PARAFFIN BATH THERAPY: CPT

## 2018-09-21 PROCEDURE — 97140 MANUAL THERAPY 1/> REGIONS: CPT

## 2018-09-21 PROCEDURE — 97530 THERAPEUTIC ACTIVITIES: CPT

## 2018-09-21 NOTE — PROGRESS NOTES
Phone: [phone]    Fax: [phone]                       Outpatient Occupational Therapy                                                                            Daily Note  Date: 2018   MRN: [id number]    ACCT#: [de-identified]  Patient: Desirae aCnada  : 1990  Referring Practitioner: Dr. Trell Ch    Diagnosis: R LF fracture      Onset Date: 18  Total # of Visits Approved: 12 Per Physician Order  Total # of Visits to Date: 6  No Show: 2  Canceled Appointment: 0       Pre-Treament Pain: 0/10  Pain at present: 0  Subjective: Patient reports \"theres not much pain today. \"       Exercises/Modalities:  See DocFlow Sheet    Assessment  Assessment: Tolerated treatment session well on this date. Patient reports she is concerned with the lack of movement at the DIP joint of small finger. Patient is able to touch [email or web address] and produce a full fist with the current AROM that she has. Therapist attempted to fabricate a static progressive DIP flexion splint with no success. Will attempt to fabricate a different configuration next treatment session. Overall patient is progressing very well. Post Treatment Pain: 0/10      Goals  Short Term Goals  Time Frame for Short term goals: 2 weeks (18)  Short term goal 1: Patient to be independent with HEP. - MET  Short term goal 2: Edema reduction of R small finger PIP joint to 5.2 cm or less in order to allow for improved AROM. - NOT MET   Short term goal 3: Patient to demonstrate ability to touch thumb to each finger in order to  small objects. - MET        Long Term Goals  Time Frame for Long term goals : 6 weeks (10-17-18)   Long term goal 1: Patient to demonstrate ability to produce a full fist, touching all finger tips to [facility] in order to grasp objects in the R hand.  - MET  Long term goal 2: Patient to increase AROM of the small finger MCP 0-80, PIP 0-90, DIP 0-70 in order to  change and keep in

## 2019-01-21 ENCOUNTER — HOSPITAL ENCOUNTER (EMERGENCY)
Age: 29
Discharge: LEFT BEFORE DISCHARGE | End: 2019-01-21
Attending: FAMILY MEDICINE
Payer: COMMERCIAL

## 2019-01-21 VITALS
DIASTOLIC BLOOD PRESSURE: 64 MMHG | TEMPERATURE: 97.8 F | BODY MASS INDEX: 22.77 KG/M2 | HEIGHT: 64 IN | RESPIRATION RATE: 18 BRPM | OXYGEN SATURATION: 100 % | WEIGHT: 133.4 LBS | HEART RATE: 74 BPM | SYSTOLIC BLOOD PRESSURE: 113 MMHG

## 2019-01-21 DIAGNOSIS — O03.9 MISCARRIAGE: Primary | ICD-10-CM

## 2019-01-21 DIAGNOSIS — Z87.59 HISTORY OF MISCARRIAGE: ICD-10-CM

## 2019-01-21 LAB
-: ABNORMAL
ABO/RH: NORMAL
ABSOLUTE EOS #: 0.2 K/UL (ref 0–0.4)
ABSOLUTE IMMATURE GRANULOCYTE: ABNORMAL K/UL (ref 0–0.3)
ABSOLUTE LYMPH #: 2 K/UL (ref 1–4.8)
ABSOLUTE MONO #: 0.7 K/UL (ref 0–1)
AMORPHOUS: ABNORMAL
AMPHETAMINE SCREEN URINE: NEGATIVE
ANION GAP SERPL CALCULATED.3IONS-SCNC: 14 MMOL/L (ref 9–17)
BACTERIA: ABNORMAL
BARBITURATE SCREEN URINE: NEGATIVE
BASOPHILS # BLD: 0 % (ref 0–2)
BASOPHILS ABSOLUTE: 0.1 K/UL (ref 0–0.2)
BENZODIAZEPINE SCREEN, URINE: NEGATIVE
BILIRUBIN URINE: NEGATIVE
BUN BLDV-MCNC: 9 MG/DL (ref 6–20)
BUN/CREAT BLD: 16 (ref 9–20)
BUPRENORPHINE URINE: ABNORMAL
CALCIUM SERPL-MCNC: 10.1 MG/DL (ref 8.6–10.4)
CANNABINOID SCREEN URINE: POSITIVE
CASTS UA: ABNORMAL /LPF
CHLORIDE BLD-SCNC: 105 MMOL/L (ref 98–107)
CO2: 19 MMOL/L (ref 20–31)
COCAINE METABOLITE, URINE: NEGATIVE
COLOR: ABNORMAL
COMMENT UA: ABNORMAL
CREAT SERPL-MCNC: 0.56 MG/DL (ref 0.5–0.9)
CRYSTALS, UA: ABNORMAL /HPF
DIFFERENTIAL TYPE: YES
EOSINOPHILS RELATIVE PERCENT: 1 % (ref 0–5)
EPITHELIAL CELLS UA: ABNORMAL /HPF
GFR AFRICAN AMERICAN: >60 ML/MIN
GFR NON-AFRICAN AMERICAN: >60 ML/MIN
GFR SERPL CREATININE-BSD FRML MDRD: ABNORMAL ML/MIN/{1.73_M2}
GFR SERPL CREATININE-BSD FRML MDRD: ABNORMAL ML/MIN/{1.73_M2}
GLUCOSE BLD-MCNC: 122 MG/DL (ref 70–99)
GLUCOSE URINE: NEGATIVE
HCG QUANTITATIVE: 5211 IU/L
HCT VFR BLD CALC: 44.5 % (ref 36–46)
HEMOGLOBIN: 15 G/DL (ref 12–16)
IMMATURE GRANULOCYTES: ABNORMAL %
INR BLD: 1
KETONES, URINE: NEGATIVE
LEUKOCYTE ESTERASE, URINE: ABNORMAL
LYMPHOCYTES # BLD: 14 % (ref 15–40)
MCH RBC QN AUTO: 30.5 PG (ref 26–34)
MCHC RBC AUTO-ENTMCNC: 33.7 G/DL (ref 31–37)
MCV RBC AUTO: 90.3 FL (ref 80–100)
MDMA URINE: ABNORMAL
METHADONE SCREEN, URINE: NEGATIVE
METHAMPHETAMINE, URINE: NEGATIVE
MONOCYTES # BLD: 5 % (ref 4–8)
MUCUS: ABNORMAL
NITRITE, URINE: NEGATIVE
NRBC AUTOMATED: ABNORMAL PER 100 WBC
OPIATES, URINE: POSITIVE
OTHER OBSERVATIONS UA: ABNORMAL
OXYCODONE SCREEN URINE: NEGATIVE
PDW BLD-RTO: 14.6 % (ref 12.1–15.2)
PH UA: 6 (ref 5–8)
PHENCYCLIDINE, URINE: NEGATIVE
PLATELET # BLD: 193 K/UL (ref 140–450)
PLATELET ESTIMATE: ABNORMAL
PMV BLD AUTO: ABNORMAL FL (ref 6–12)
POTASSIUM SERPL-SCNC: 3.9 MMOL/L (ref 3.7–5.3)
PROGESTERONE LEVEL: 4.26 NG/ML
PROPOXYPHENE, URINE: NEGATIVE
PROTEIN UA: ABNORMAL
PROTHROMBIN TIME: 9.9 SEC (ref 9–11.6)
RBC # BLD: 4.93 M/UL (ref 4–5.2)
RBC # BLD: ABNORMAL 10*6/UL
RBC UA: ABNORMAL /HPF (ref 0–2)
RENAL EPITHELIAL, UA: ABNORMAL /HPF
SEG NEUTROPHILS: 80 % (ref 47–75)
SEGMENTED NEUTROPHILS ABSOLUTE COUNT: 12 K/UL (ref 2.5–7)
SODIUM BLD-SCNC: 138 MMOL/L (ref 135–144)
SPECIFIC GRAVITY UA: 1.02 (ref 1–1.03)
TEST INFORMATION: ABNORMAL
TRICHOMONAS: ABNORMAL
TRICYCLIC ANTIDEPRESSANTS, UR: NEGATIVE
TURBIDITY: ABNORMAL
URINE HGB: ABNORMAL
UROBILINOGEN, URINE: NORMAL
WBC # BLD: 15.1 K/UL (ref 3.5–11)
WBC # BLD: ABNORMAL 10*3/UL
WBC UA: ABNORMAL /HPF
YEAST: ABNORMAL

## 2019-01-21 PROCEDURE — 87086 URINE CULTURE/COLONY COUNT: CPT

## 2019-01-21 PROCEDURE — 6360000002 HC RX W HCPCS: Performed by: FAMILY MEDICINE

## 2019-01-21 PROCEDURE — 85025 COMPLETE CBC W/AUTO DIFF WBC: CPT

## 2019-01-21 PROCEDURE — 80048 BASIC METABOLIC PNL TOTAL CA: CPT

## 2019-01-21 PROCEDURE — 96374 THER/PROPH/DIAG INJ IV PUSH: CPT

## 2019-01-21 PROCEDURE — 86901 BLOOD TYPING SEROLOGIC RH(D): CPT

## 2019-01-21 PROCEDURE — 80306 DRUG TEST PRSMV INSTRMNT: CPT

## 2019-01-21 PROCEDURE — 99284 EMERGENCY DEPT VISIT MOD MDM: CPT

## 2019-01-21 PROCEDURE — 81001 URINALYSIS AUTO W/SCOPE: CPT

## 2019-01-21 PROCEDURE — 86900 BLOOD TYPING SEROLOGIC ABO: CPT

## 2019-01-21 PROCEDURE — 36415 COLL VENOUS BLD VENIPUNCTURE: CPT

## 2019-01-21 PROCEDURE — 88305 TISSUE EXAM BY PATHOLOGIST: CPT

## 2019-01-21 PROCEDURE — 85610 PROTHROMBIN TIME: CPT

## 2019-01-21 PROCEDURE — 84702 CHORIONIC GONADOTROPIN TEST: CPT

## 2019-01-21 PROCEDURE — 84144 ASSAY OF PROGESTERONE: CPT

## 2019-01-21 PROCEDURE — 96375 TX/PRO/DX INJ NEW DRUG ADDON: CPT

## 2019-01-21 RX ORDER — PROMETHAZINE HYDROCHLORIDE 25 MG/ML
12.5 INJECTION, SOLUTION INTRAMUSCULAR; INTRAVENOUS ONCE
Status: COMPLETED | OUTPATIENT
Start: 2019-01-21 | End: 2019-01-21

## 2019-01-21 RX ORDER — MORPHINE SULFATE 4 MG/ML
4 INJECTION, SOLUTION INTRAMUSCULAR; INTRAVENOUS ONCE
Status: DISCONTINUED | OUTPATIENT
Start: 2019-01-21 | End: 2019-01-21 | Stop reason: HOSPADM

## 2019-01-21 RX ORDER — MORPHINE SULFATE 4 MG/ML
2 INJECTION, SOLUTION INTRAMUSCULAR; INTRAVENOUS ONCE
Status: COMPLETED | OUTPATIENT
Start: 2019-01-21 | End: 2019-01-21

## 2019-01-21 RX ORDER — ACETAMINOPHEN 325 MG/1
650 TABLET ORAL EVERY 6 HOURS PRN
COMMUNITY

## 2019-01-21 RX ORDER — ONDANSETRON 2 MG/ML
4 INJECTION INTRAMUSCULAR; INTRAVENOUS ONCE
Status: COMPLETED | OUTPATIENT
Start: 2019-01-21 | End: 2019-01-21

## 2019-01-21 RX ORDER — PROMETHAZINE HYDROCHLORIDE 25 MG/ML
12.5 INJECTION, SOLUTION INTRAMUSCULAR; INTRAVENOUS ONCE
Status: DISCONTINUED | OUTPATIENT
Start: 2019-01-21 | End: 2019-01-21 | Stop reason: HOSPADM

## 2019-01-21 RX ADMIN — MORPHINE SULFATE 2 MG: 4 INJECTION, SOLUTION INTRAMUSCULAR; INTRAVENOUS at 06:44

## 2019-01-21 RX ADMIN — ONDANSETRON 4 MG: 2 INJECTION INTRAMUSCULAR; INTRAVENOUS at 06:45

## 2019-01-21 RX ADMIN — PROMETHAZINE HYDROCHLORIDE 12.5 MG: 25 INJECTION INTRAMUSCULAR; INTRAVENOUS at 07:16

## 2019-01-21 ASSESSMENT — PAIN DESCRIPTION - DESCRIPTORS: DESCRIPTORS: CONSTANT

## 2019-01-21 ASSESSMENT — PAIN SCALES - GENERAL
PAINLEVEL_OUTOF10: 9

## 2019-01-21 ASSESSMENT — PAIN DESCRIPTION - FREQUENCY: FREQUENCY: INTERMITTENT

## 2019-01-22 LAB
CULTURE: NO GROWTH
Lab: NORMAL
SPECIMEN DESCRIPTION: NORMAL
STATUS: NORMAL
SURGICAL PATHOLOGY REPORT: NORMAL

## 2019-08-31 ENCOUNTER — HOSPITAL ENCOUNTER (EMERGENCY)
Age: 29
Discharge: HOME | End: 2019-08-31
Attending: FAMILY MEDICINE
Payer: COMMERCIAL

## 2019-08-31 ENCOUNTER — APPOINTMENT (OUTPATIENT)
Dept: GENERAL RADIOLOGY | Age: 29
End: 2019-08-31
Payer: COMMERCIAL

## 2019-08-31 VITALS
RESPIRATION RATE: 18 BRPM | SYSTOLIC BLOOD PRESSURE: 134 MMHG | WEIGHT: 113 LBS | TEMPERATURE: 97.8 F | DIASTOLIC BLOOD PRESSURE: 79 MMHG | OXYGEN SATURATION: 100 % | BODY MASS INDEX: 19.4 KG/M2 | HEART RATE: 70 BPM

## 2019-08-31 DIAGNOSIS — S63.502A SPRAIN OF LEFT WRIST, INITIAL ENCOUNTER: ICD-10-CM

## 2019-08-31 DIAGNOSIS — S39.012A STRAIN OF LUMBAR REGION, INITIAL ENCOUNTER: ICD-10-CM

## 2019-08-31 DIAGNOSIS — S62.656A CLOSED NONDISPLACED FRACTURE OF MIDDLE PHALANX OF RIGHT LITTLE FINGER, INITIAL ENCOUNTER: Primary | ICD-10-CM

## 2019-08-31 PROCEDURE — 99283 EMERGENCY DEPT VISIT LOW MDM: CPT

## 2019-08-31 PROCEDURE — 73130 X-RAY EXAM OF HAND: CPT

## 2019-08-31 PROCEDURE — 73110 X-RAY EXAM OF WRIST: CPT

## 2019-08-31 PROCEDURE — 72110 X-RAY EXAM L-2 SPINE 4/>VWS: CPT

## 2019-08-31 RX ORDER — CEPHALEXIN 500 MG/1
500 CAPSULE ORAL 2 TIMES DAILY
COMMUNITY
Start: 2019-08-25 | End: 2021-05-27

## 2019-08-31 RX ORDER — BUPRENORPHINE HYDROCHLORIDE AND NALOXONE HYDROCHLORIDE DIHYDRATE 8; 2 MG/1; MG/1
TABLET SUBLINGUAL DAILY
COMMUNITY
Start: 2019-08-23 | End: 2021-05-27

## 2019-08-31 RX ORDER — TRAZODONE HYDROCHLORIDE 100 MG/1
100 TABLET ORAL NIGHTLY
COMMUNITY
Start: 2019-07-31 | End: 2021-05-27 | Stop reason: ALTCHOICE

## 2019-08-31 RX ORDER — BUSPIRONE HYDROCHLORIDE 5 MG/1
5 TABLET ORAL 3 TIMES DAILY
COMMUNITY
Start: 2019-07-31 | End: 2021-05-27 | Stop reason: ALTCHOICE

## 2019-08-31 RX ORDER — CYCLOBENZAPRINE HCL 10 MG
10 TABLET ORAL 3 TIMES DAILY PRN
Qty: 15 TABLET | Refills: 0 | Status: SHIPPED | OUTPATIENT
Start: 2019-08-31 | End: 2019-09-10

## 2019-08-31 RX ORDER — IBUPROFEN 600 MG/1
600 TABLET ORAL 3 TIMES DAILY PRN
COMMUNITY
Start: 2019-08-25 | End: 2021-05-27 | Stop reason: ALTCHOICE

## 2019-08-31 RX ORDER — PAROXETINE HYDROCHLORIDE 20 MG/1
20 TABLET, FILM COATED ORAL DAILY
COMMUNITY
Start: 2019-07-31 | End: 2021-05-27 | Stop reason: ALTCHOICE

## 2019-08-31 ASSESSMENT — PAIN DESCRIPTION - LOCATION: LOCATION: BACK

## 2019-08-31 ASSESSMENT — PAIN DESCRIPTION - PAIN TYPE: TYPE: ACUTE PAIN

## 2019-08-31 ASSESSMENT — PAIN SCALES - GENERAL: PAINLEVEL_OUTOF10: 9

## 2019-09-01 NOTE — ED PROVIDER NOTES
history. SOCIAL HISTORY    Social History     Socioeconomic History    Marital status:      Spouse name: None    Number of children: None    Years of education: None    Highest education level: None   Occupational History    None   Social Needs    Financial resource strain: None    Food insecurity:     Worry: None     Inability: None    Transportation needs:     Medical: None     Non-medical: None   Tobacco Use    Smoking status: Current Every Day Smoker     Packs/day: 0.50    Smokeless tobacco: Never Used   Substance and Sexual Activity    Alcohol use: Yes     Alcohol/week: 3.0 standard drinks     Types: 3 Cans of beer per week    Drug use: No    Sexual activity: None   Lifestyle    Physical activity:     Days per week: None     Minutes per session: None    Stress: None   Relationships    Social connections:     Talks on phone: None     Gets together: None     Attends Worship service: None     Active member of club or organization: None     Attends meetings of clubs or organizations: None     Relationship status: None    Intimate partner violence:     Fear of current or ex partner: None     Emotionally abused: None     Physically abused: None     Forced sexual activity: None   Other Topics Concern    None   Social History Narrative    None       PHYSICAL EXAM    VITAL SIGNS: /79   Pulse 70   Temp 97.8 °F (36.6 °C) (Oral)   Resp 18   Wt 113 lb (51.3 kg)   LMP 10/16/2018   SpO2 100%   Breastfeeding? Unknown   BMI 19.40 kg/m²   Constitutional:  Well developed, well nourished, moderate acute distress, non-toxic appearance   HENT:  Atraumatic, external ears normal, nose normal, oropharynx moist.  Neck- normal range of motion, no tenderness, supple   Respiratory:  No respiratory distress, normal breath sounds.    Cardiovascular:  Normal rate, normal rhythm, no murmurs, no gallops, no rubs   GI:  Soft, nondistended, normal bowel sounds, nontender   Musculoskeletal:  Tenderness right  5th finger. Decreased range of motion due to pain. Tenderness left wrist and left hand. Decreased range of motion due to pain. Back- lumbar tenderness. Pain with flexion and extension. Decreased range of motion due to pain. Straight leg raise negative. Integument:  Well hydrated, no rash   Neurologic:  Grossly intact. RADIOLOGY/PROCEDURES      X-ray of left hand and left wrist were negative for acute changes. X-ray of lumbar spine was negative for acute changes. X-ray of right hand revealed a new acute avulsion fracture involving the distal and volar aspect of the fifth middle phalanx    ED COURSE & MEDICAL DECISION MAKING    Pertinent Labs & Imaging studies reviewed. (See chart for details)  Splint was applied to left 5th finger. Patient was stable on discharge. FINAL IMPRESSION    1. Fracture of middle phalanx of left little finger. 2.  Lumbar strain  3    Left wrist sprain    Summation      Patient Course: Patient was stable on discharge. ED Medications administered this visit:  Medications - No data to display    New Prescriptions from this visit:    Discharge Medication List as of 8/31/2019  5:08 PM      START taking these medications    Details   cyclobenzaprine (FLEXERIL) 10 MG tablet Take 1 tablet by mouth 3 times daily as needed for Muscle spasms, Disp-15 tablet, R-0Print             Follow-up:  Blanka Brown DO  [address] [facility] [address] [phone]    Call in 3 days          Final Impression:   1. Closed nondisplaced fracture of middle phalanx of right little finger, initial encounter    2. Strain of lumbar region, initial encounter    3.  Sprain of left wrist, initial encounter               (Please note that portions of this note were completed with a voice recognition program.  Efforts were made to edit the dictations but occasionally words are mis-transcribed.)     Ariela Naqvi MD  09/01/19 5679

## 2021-05-27 ENCOUNTER — HOSPITAL ENCOUNTER (EMERGENCY)
Age: 31
Discharge: HOME | End: 2021-05-27
Attending: EMERGENCY MEDICINE
Payer: COMMERCIAL

## 2021-05-27 VITALS
OXYGEN SATURATION: 97 % | BODY MASS INDEX: 20.25 KG/M2 | RESPIRATION RATE: 18 BRPM | TEMPERATURE: 98.5 F | SYSTOLIC BLOOD PRESSURE: 104 MMHG | HEART RATE: 89 BPM | DIASTOLIC BLOOD PRESSURE: 78 MMHG | WEIGHT: 118 LBS

## 2021-05-27 DIAGNOSIS — G56.03 BILATERAL CARPAL TUNNEL SYNDROME: Primary | ICD-10-CM

## 2021-05-27 PROCEDURE — 99282 EMERGENCY DEPT VISIT SF MDM: CPT

## 2021-05-27 RX ORDER — PREDNISONE 20 MG/1
TABLET ORAL
Qty: 10 TABLET | Refills: 0 | Status: SHIPPED | OUTPATIENT
Start: 2021-05-27

## 2021-05-27 ASSESSMENT — PAIN DESCRIPTION - PAIN TYPE: TYPE: ACUTE PAIN

## 2021-05-27 ASSESSMENT — PAIN DESCRIPTION - DESCRIPTORS: DESCRIPTORS: TINGLING;NUMBNESS

## 2021-05-27 NOTE — LETTER
[facility] ED  [address], [address]  Phone: [phone]               May 27, 2021    Patient: Yi Hewitt   YOB: 1990   Date of Visit: 5/27/2021       To Whom It May Concern:    Yi Hewitt was seen and treated in our emergency department on 5/27/2021. She may return to work on 6/3/2021.       Sincerely,       Iman Nova RN         Signature:__________________________________

## 2021-05-27 NOTE — LETTER
[facility] ED  [address], [address]  Phone: [phone]               May 27, 2021    Patient: Bruna Clark   YOB: 1990   Date of Visit: 5/27/2021       To Whom It May Concern:    Bruna Clark was seen and treated in our emergency department on 5/27/2021. She may return to work on 5/31/2021. Upon returning [place] will need to avoid repetitive movements with hands/wrists until cleared by orthopedic doctor.        Sincerely,       Leticia Paiz RN         Signature:__________________________________

## 2021-05-27 NOTE — ED PROVIDER NOTES
eMERGENCY dEPARTMENT eNCOUnter        [address]    Chief Complaint   Patient presents with    Numbness     Pt woke up with numbness to both arms and hands. Pt works at [employer] and does repetative motion to boths hand all day. [facility]    Dee Dee Vicente is a 32 y.o. female who presents to ED from home. By car. With complaint of bilateral wrist pain and numbness of both hands. Patient works at [employer]. Onset chronic. The symptoms have been worse in the past few days. Patient works at [employer] and does repetitive work with both of her hands all day. Intensity of symptoms moderate pain in the wrist.  Location of symptoms bilateral wrist.  Patient recently bought and has been using wrist braces. REVIEW OF SYSTEMS    All systems reviewed and positives are in the [facility]      PAST MEDICAL HISTORY    Past Medical History:   Diagnosis Date    Anxiety     Seizures ([facility] [address]     head injury--last seizure 2 years ago       SURGICAL HISTORY    Past Surgical History:   Procedure Laterality Date    NE OFFICE/OUTPT VISIT,PROCEDURE ONLY Right 8/1/2018    RIGHT LITTLE FINGER OPEN REDUCTION INTERNAL FIXATION performed by Sandra Vela DO at [place] [address]    Current Outpatient Rx   Medication Sig Dispense Refill    predniSONE (DELTASONE) 20 MG tablet 2 tablets daily x3 days then 1 tablet daily 10 tablet 0    acetaminophen (TYLENOL) 325 MG tablet Take 650 mg by mouth every 6 hours as needed for Pain      ibuprofen (ADVIL;MOTRIN) 200 MG tablet Take 2 tablets by mouth every 6 hours as needed for Pain         ALLERGIES    No Known Allergies    FAMILY HISTORY    History reviewed. No pertinent family history.     SOCIAL HISTORY    Social History     Socioeconomic History    Marital status:      Spouse name: None    Number of children: None    Years of education: None    Highest education level: None   Occupational History    None   Tobacco Use    Smoking status: Current Every Day Smoker Packs/day: 0.50    Smokeless tobacco: Never Used   Vaping Use    Vaping Use: Never used   Substance and Sexual Activity    Alcohol use: Yes     Alcohol/week: 3.0 standard drinks     Types: 3 Cans of beer per week    Drug use: No    Sexual activity: None   Other Topics Concern    None   Social History Narrative    None     Social Determinants of Health     Financial Resource Strain:     Difficulty of Paying Living Expenses:    Food Insecurity:     Worried About Running Out of Food in the Last Year:     Ran Out of Food in the Last Year:    Transportation Needs:     Lack of Transportation (Medical):  Lack of Transportation (Non-Medical):    Physical Activity:     Days of Exercise per Week:     Minutes of Exercise per Session:    Stress:     Feeling of Stress :    Social Connections:     Frequency of Communication with Friends and Family:     Frequency of Social Gatherings with Friends and Family:     Attends Islam Services:     Active Member of Clubs or Organizations:     Attends Club or Organization Meetings:     Marital Status:    Intimate Partner Violence:     Fear of Current or Ex-Partner:     Emotionally Abused:     Physically Abused:     Sexually Abused:        PHYSICAL EXAM    VITAL SIGNS: /78   Pulse 89   Temp 98.5 °F (36.9 °C) (Oral)   Resp 18   Wt 118 lb (53.5 kg)   LMP 02/22/2021   SpO2 97%   BMI 20.25 kg/m²   Constitutional:  Well developed, well nourished, no acute distress, non-toxic appearance   HENT:  Atraumatic, external ears normal, nose normal, oropharynx moist.  Neck- normal range of motion, no tenderness, supple   Respiratory:  No respiratory distress, normal breath sounds. Cardiovascular:  Normal rate, normal rhythm, no murmurs, no gallops, no rubs   GI:  Soft, nondistended, normal bowel sounds, nontender   Musculoskeletal: Patient has symptoms of bilateral carpal tunnel. Positive Tinel sign.   Integument:  Well hydrated, no rash   Neurologic: RADIOLOGY/PROCEDURES    No orders to display         Labs  Labs Reviewed - No data to display          Summation      Patient Course: Patient has symptoms of bilateral carpal tunnel. Patient will be sent home on prednisone. Continue to use braces bilaterally. Follow-up with Ortho in a week. Avoid repetitive strenuous physical activity with both hands. Work note was given to the patient. ED Medications administered this visit:  Medications - No data to display    New Prescriptions from this visit:    New Prescriptions    PREDNISONE (DELTASONE) 20 MG TABLET    2 tablets daily x3 days then 1 tablet daily       Follow-up:  [facility] [address]  Schedule an appointment as soon as possible for a visit in 1 week          Final Impression:   1.  Bilateral carpal tunnel syndrome               (Please note that portions of this note were completed with a voice recognition program.  Efforts were made to edit the dictations but occasionally words are mis-transcribed.)          Esha Quintanilla MD  05/27/21 4200

## (undated) DEVICE — OCCLUSIVE GAUZE STRIP,3% BISMUTH TRIBROMOPHENATE IN PETROLATUM BLEND: Brand: XEROFORM

## (undated) DEVICE — NEEDLE HYPO 22GA L1IN BLK POLYPR HUB S STL REG BVL STR W/O

## (undated) DEVICE — BANDAGE COMPR W2INXL5YD HI E BGE W/ CLP SURE-WRAP

## (undated) DEVICE — GOWN,AURORA,NON-REINFORCED,2XL: Brand: MEDLINE

## (undated) DEVICE — FRAZIER SUCTION INSTRUMENT 12 FR W/CONTROL VENT & OBTURATOR: Brand: FRAZIER

## (undated) DEVICE — DRAPE SURG L 60X76IN SMS FAB UNIV ANCIL RESIST FLAME TEARING

## (undated) DEVICE — GAUZE,SPONGE,4"X4",16PLY,XRAY,STRL,LF: Brand: MEDLINE

## (undated) DEVICE — Device

## (undated) DEVICE — NEEDLE HYPO 18GA L1.5IN PNK POLYPR HUB S STL REG BVL STR

## (undated) DEVICE — TIP ELECSURG BOVIE

## (undated) DEVICE — NDLCTR: FOAM/MAG 40CT 64/CS: Brand: MEDICAL ACTION INDUSTRIES

## (undated) DEVICE — SPONGE GZ W4XL4IN COT 12 PLY TYP VII WVN C FLD DSGN

## (undated) DEVICE — Z DISCONTINUED PER MEDLINE (LOW STOCK)  USE 2422770 DRAPE C ARM W54XL78IN FOR FLROSCN

## (undated) DEVICE — ELECTRODE ES AD CRD L15FT DISP FOR PT BELOW 30LB REM

## (undated) DEVICE — T-DRAPE,EXTREMITY,STERILE: Brand: MEDLINE

## (undated) DEVICE — MEDI-VAC NON-CONDUCTIVE SUCTION TUBING 6MM X 6.1M (20 FT.) L: Brand: CARDINAL HEALTH

## (undated) DEVICE — INTENDED FOR TISSUE SEPARATION, AND OTHER PROCEDURES THAT REQUIRE A SHARP SURGICAL BLADE TO PUNCTURE OR CUT.: Brand: BARD-PARKER ® CARBON RIB-BACK BLADES

## (undated) DEVICE — GLOVE SURG SZ 85 L12IN FNGR THK87MIL WHT LTX FREE

## (undated) DEVICE — 3M™ DURAPORE™ SURGICAL TAPE 1538-1, 1 INCH X 10 YARD (2,5CM X 9,1M), 12 ROLLS/BOX: Brand: 3M™ DURAPORE™

## (undated) DEVICE — DISCONTINUED USE 393278 SYRINGE 10 ML HYPO W/O NDL LL TP PLSTC ST

## (undated) DEVICE — CHLORAPREP 26ML ORANGE

## (undated) DEVICE — GOWN,AURORA,NONRNF,XL,30/CS: Brand: MEDLINE

## (undated) DEVICE — SKIN MARKER,REGULAR TIP WITH RULER: Brand: DEVON

## (undated) DEVICE — CAP PROTCT YEL REFIL GEN FOR 0.45IN WIRE W SER PIN BALL

## (undated) DEVICE — SUTURE ETHLN SZ 5-0 L18IN NONABSORBABLE BLK L13MM P-3 3/8 698H

## (undated) DEVICE — ZIMMER® STERILE DISPOSABLE TOURNIQUET CUFF WITH PLC, SINGLE PORT, SINGLE BLADDER, 18 IN. (46 CM)

## (undated) DEVICE — SOLUTION IV IRRIG POUR BRL 0.9% SODIUM CHL 2F7124

## (undated) DEVICE — SUTURE ETHBND EXCEL SZ 3-0 L36IN NONABSORBABLE GRN RB-1 X558H

## (undated) DEVICE — GLOVE SURG SZ 85 L12IN FNGR THK13MIL WHT ISOLEX POLYISOPRENE

## (undated) DEVICE — HAND AND FT PK

## (undated) DEVICE — SYRINGE BULB 50/CS 48/PLT: Brand: MEDEGEN MEDICAL PRODUCTS, LLC

## (undated) DEVICE — SUTURE PDS II SZ 4-0 L27IN ABSRB VLT L17MM RB-1 1/2 CIR Z304H